# Patient Record
Sex: MALE | Race: OTHER | Employment: OTHER | URBAN - METROPOLITAN AREA
[De-identification: names, ages, dates, MRNs, and addresses within clinical notes are randomized per-mention and may not be internally consistent; named-entity substitution may affect disease eponyms.]

---

## 2018-02-24 ENCOUNTER — APPOINTMENT (OUTPATIENT)
Dept: CT IMAGING | Age: 58
End: 2018-02-24
Attending: EMERGENCY MEDICINE
Payer: SELF-PAY

## 2018-02-24 ENCOUNTER — HOSPITAL ENCOUNTER (EMERGENCY)
Age: 58
Discharge: HOME | End: 2018-02-24
Attending: EMERGENCY MEDICINE
Payer: SELF-PAY

## 2018-02-24 VITALS
WEIGHT: 132.06 LBS | DIASTOLIC BLOOD PRESSURE: 75 MMHG | OXYGEN SATURATION: 99 % | RESPIRATION RATE: 18 BRPM | HEART RATE: 65 BPM | HEIGHT: 67 IN | BODY MASS INDEX: 20.73 KG/M2 | SYSTOLIC BLOOD PRESSURE: 124 MMHG | TEMPERATURE: 97.5 F

## 2018-02-24 DIAGNOSIS — R10.84 ABDOMINAL PAIN, GENERALIZED: Primary | ICD-10-CM

## 2018-02-24 DIAGNOSIS — A08.4 VIRAL GASTROENTERITIS: ICD-10-CM

## 2018-02-24 LAB
ALBUMIN SERPL-MCNC: 3.3 G/DL (ref 3.5–5)
ALBUMIN/GLOB SERPL: 1.1 {RATIO} (ref 1.1–2.2)
ALP SERPL-CCNC: 63 U/L (ref 45–117)
ALT SERPL-CCNC: 14 U/L (ref 12–78)
ANION GAP SERPL CALC-SCNC: 6 MMOL/L (ref 5–15)
AST SERPL-CCNC: 15 U/L (ref 15–37)
BASOPHILS # BLD: 0.1 K/UL (ref 0–0.1)
BASOPHILS NFR BLD: 1 % (ref 0–1)
BILIRUB SERPL-MCNC: 0.4 MG/DL (ref 0.2–1)
BUN SERPL-MCNC: 15 MG/DL (ref 6–20)
BUN/CREAT SERPL: 18 (ref 12–20)
CALCIUM SERPL-MCNC: 8.3 MG/DL (ref 8.5–10.1)
CHLORIDE SERPL-SCNC: 107 MMOL/L (ref 97–108)
CO2 SERPL-SCNC: 27 MMOL/L (ref 21–32)
CREAT SERPL-MCNC: 0.84 MG/DL (ref 0.7–1.3)
DIFFERENTIAL METHOD BLD: ABNORMAL
EOSINOPHIL # BLD: 1.7 K/UL (ref 0–0.4)
EOSINOPHIL NFR BLD: 21 % (ref 0–7)
ERYTHROCYTE [DISTWIDTH] IN BLOOD BY AUTOMATED COUNT: 13.1 % (ref 11.5–14.5)
GLOBULIN SER CALC-MCNC: 3.1 G/DL (ref 2–4)
GLUCOSE SERPL-MCNC: 152 MG/DL (ref 65–100)
HCT VFR BLD AUTO: 38.7 % (ref 36.6–50.3)
HGB BLD-MCNC: 13.2 G/DL (ref 12.1–17)
IMM GRANULOCYTES # BLD: 0 K/UL (ref 0–0.04)
IMM GRANULOCYTES NFR BLD AUTO: 0 % (ref 0–0.5)
LIPASE SERPL-CCNC: 135 U/L (ref 73–393)
LYMPHOCYTES # BLD: 1.9 K/UL (ref 0.8–3.5)
LYMPHOCYTES NFR BLD: 24 % (ref 12–49)
MCH RBC QN AUTO: 30.8 PG (ref 26–34)
MCHC RBC AUTO-ENTMCNC: 34.1 G/DL (ref 30–36.5)
MCV RBC AUTO: 90.2 FL (ref 80–99)
MONOCYTES # BLD: 0.6 K/UL (ref 0–1)
MONOCYTES NFR BLD: 7 % (ref 5–13)
NEUTS SEG # BLD: 3.6 K/UL (ref 1.8–8)
NEUTS SEG NFR BLD: 47 % (ref 32–75)
NRBC # BLD: 0 K/UL (ref 0–0.01)
NRBC BLD-RTO: 0 PER 100 WBC
PLATELET # BLD AUTO: 254 K/UL (ref 150–400)
PMV BLD AUTO: 9.2 FL (ref 8.9–12.9)
POTASSIUM SERPL-SCNC: 3.8 MMOL/L (ref 3.5–5.1)
PROT SERPL-MCNC: 6.4 G/DL (ref 6.4–8.2)
RBC # BLD AUTO: 4.29 M/UL (ref 4.1–5.7)
RBC MORPH BLD: ABNORMAL
SODIUM SERPL-SCNC: 140 MMOL/L (ref 136–145)
TROPONIN I SERPL-MCNC: 0.04 NG/ML
WBC # BLD AUTO: 7.9 K/UL (ref 4.1–11.1)

## 2018-02-24 PROCEDURE — 74177 CT ABD & PELVIS W/CONTRAST: CPT

## 2018-02-24 PROCEDURE — 74011250637 HC RX REV CODE- 250/637: Performed by: EMERGENCY MEDICINE

## 2018-02-24 PROCEDURE — 96374 THER/PROPH/DIAG INJ IV PUSH: CPT

## 2018-02-24 PROCEDURE — 74011636320 HC RX REV CODE- 636/320: Performed by: EMERGENCY MEDICINE

## 2018-02-24 PROCEDURE — 83690 ASSAY OF LIPASE: CPT | Performed by: EMERGENCY MEDICINE

## 2018-02-24 PROCEDURE — 74011250636 HC RX REV CODE- 250/636: Performed by: EMERGENCY MEDICINE

## 2018-02-24 PROCEDURE — 93005 ELECTROCARDIOGRAM TRACING: CPT

## 2018-02-24 PROCEDURE — 99284 EMERGENCY DEPT VISIT MOD MDM: CPT

## 2018-02-24 PROCEDURE — 36415 COLL VENOUS BLD VENIPUNCTURE: CPT | Performed by: EMERGENCY MEDICINE

## 2018-02-24 PROCEDURE — 96375 TX/PRO/DX INJ NEW DRUG ADDON: CPT

## 2018-02-24 PROCEDURE — 80053 COMPREHEN METABOLIC PANEL: CPT | Performed by: EMERGENCY MEDICINE

## 2018-02-24 PROCEDURE — 96361 HYDRATE IV INFUSION ADD-ON: CPT

## 2018-02-24 PROCEDURE — 84484 ASSAY OF TROPONIN QUANT: CPT | Performed by: EMERGENCY MEDICINE

## 2018-02-24 PROCEDURE — 85025 COMPLETE CBC W/AUTO DIFF WBC: CPT | Performed by: EMERGENCY MEDICINE

## 2018-02-24 RX ORDER — ONDANSETRON 4 MG/1
4 TABLET, ORALLY DISINTEGRATING ORAL
Qty: 10 TAB | Refills: 0 | Status: SHIPPED | OUTPATIENT
Start: 2018-02-24

## 2018-02-24 RX ORDER — FENTANYL CITRATE 50 UG/ML
50 INJECTION, SOLUTION INTRAMUSCULAR; INTRAVENOUS
Status: COMPLETED | OUTPATIENT
Start: 2018-02-24 | End: 2018-02-24

## 2018-02-24 RX ORDER — KETOROLAC TROMETHAMINE 30 MG/ML
15 INJECTION, SOLUTION INTRAMUSCULAR; INTRAVENOUS
Status: COMPLETED | OUTPATIENT
Start: 2018-02-24 | End: 2018-02-24

## 2018-02-24 RX ORDER — MAGNESIUM CITRATE
296 SOLUTION, ORAL ORAL
Qty: 1 BOTTLE | Refills: 0 | Status: SHIPPED | OUTPATIENT
Start: 2018-02-24 | End: 2018-02-24

## 2018-02-24 RX ORDER — ONDANSETRON 4 MG/1
4 TABLET, ORALLY DISINTEGRATING ORAL
Status: COMPLETED | OUTPATIENT
Start: 2018-02-24 | End: 2018-02-24

## 2018-02-24 RX ADMIN — IOPAMIDOL 100 ML: 755 INJECTION, SOLUTION INTRAVENOUS at 15:24

## 2018-02-24 RX ADMIN — SODIUM CHLORIDE 1000 ML: 900 INJECTION, SOLUTION INTRAVENOUS at 14:22

## 2018-02-24 RX ADMIN — KETOROLAC TROMETHAMINE 15 MG: 30 INJECTION, SOLUTION INTRAMUSCULAR at 14:23

## 2018-02-24 RX ADMIN — ONDANSETRON 4 MG: 4 TABLET, ORALLY DISINTEGRATING ORAL at 14:23

## 2018-02-24 RX ADMIN — FENTANYL CITRATE 50 MCG: 50 INJECTION, SOLUTION INTRAMUSCULAR; INTRAVENOUS at 14:23

## 2018-02-24 NOTE — ED PROVIDER NOTES
HPI Comments: 65 y.o. male with no significant past medical history who presents to the ED with chief complaint of abdominal pain. Pt reports abdominal pain onset 6 days ago accompanied by nausea, vomiting, decreased appetite, constipation, and chills. Pt states his abdominal pain is exacerbated by eating and fluctuates in intensity. Pt states he feels like he has to have a BM but his last one was 2 days ago. Pt states he has only been eating one meal per day due to his sx. Pt states he was seen at the [address] [place] [address] and was referred to the ED for further evaluation. Pt states he has hx of appendectomy in [place]. There are no other acute medical complaints voiced at this time. PCP: No primary care provider on file. Note written by Marnie Cooper, as dictated by Anali Marcum MD 1:49 PM     The history is provided by the patient. The history is limited by a language barrier (Turkish). A  was used ([employer]). No past medical history on file. No past surgical history on file. No family history on file. Social History     Social History    Marital status:      Spouse name: N/A    Number of children: N/A    Years of education: N/A     Occupational History    Not on file. Social History Main Topics    Smoking status: Not on file    Smokeless tobacco: Not on file    Alcohol use Not on file    Drug use: Not on file    Sexual activity: Not on file     Other Topics Concern    Not on file     Social History Narrative         ALLERGIES: Review of patient's allergies indicates no known allergies. Review of Systems   Constitutional: Positive for appetite change (decreased) and chills. Gastrointestinal: Positive for abdominal pain, constipation, nausea and vomiting. All other systems reviewed and are negative.       Vitals:    02/24/18 1332   BP: 104/54   Pulse: 65   Resp: 18   Temp: 97.5 °F (36.4 °C)   SpO2: 98%   Weight: 59.9 kg (132 lb 0.9 oz)   Height: 5' 7\" (1.702 m)            Physical Exam   Constitutional: He is oriented to person, place, and time. He appears well-developed and well-nourished. No distress. Uncomfortable appearing, AxOx4, speaking in complete Polish sentences   HENT:   Head: Normocephalic and atraumatic. Nose: Nose normal.   Mouth/Throat: Oropharynx is clear and moist. No oropharyngeal exudate. Eyes: Conjunctivae and EOM are normal. Pupils are equal, round, and reactive to light. Right eye exhibits no discharge. Left eye exhibits no discharge. Neck: Normal range of motion. Neck supple. Cardiovascular: Normal rate, regular rhythm, normal heart sounds and intact distal pulses. Exam reveals no gallop and no friction rub. No murmur heard. Pulmonary/Chest: Effort normal and breath sounds normal. No respiratory distress. He has no wheezes. He has no rales. He exhibits no tenderness. Abdominal: Soft. Bowel sounds are normal. He exhibits no distension and no mass. There is tenderness. There is no rebound and no guarding. Min ttp/ neg peritoneal signs; Genitourinary:   Genitourinary Comments: Pt denies urinary/ Testicular/ scrotal or penile  complaints   Musculoskeletal: Normal range of motion. He exhibits no edema. Lymphadenopathy:     He has no cervical adenopathy. Neurological: He is alert and oriented to person, place, and time. He has normal reflexes. No cranial nerve deficit. Coordination normal.   pt has motor/ CV/ Sensation grossly intact to all extremities, R = L in strength;   Skin: Skin is warm and dry. No rash noted. No erythema. Psychiatric: He has a normal mood and affect. Nursing note and vitals reviewed. [facility]      ED Course       Procedures    Chief Complaint   Patient presents with    Abdominal Pain       4:04 PM  The patients presenting problems have been discussed, and they are in agreement with the care plan formulated and outlined with them.   I have encouraged them to ask questions as they arise throughout their visit. MEDICATIONS GIVEN:  Medications   fentaNYL citrate (PF) injection 50 mcg (50 mcg IntraVENous Given 2/24/18 1423)   ketorolac (TORADOL) injection 15 mg (15 mg IntraVENous Given 2/24/18 1423)   sodium chloride 0.9 % bolus infusion 1,000 mL (1,000 mL IntraVENous New Bag 2/24/18 1422)   ondansetron (ZOFRAN ODT) tablet 4 mg (4 mg Oral Given 2/24/18 1423)   iopamidol (ISOVUE-370) 76 % injection 100 mL (100 mL IntraVENous Given 2/24/18 1524)       LABS REVIEWED:  Labs Reviewed   METABOLIC PANEL, COMPREHENSIVE - Abnormal; Notable for the following:        Result Value    Glucose 152 (*)     Calcium 8.3 (*)     Albumin 3.3 (*)     All other components within normal limits   CBC WITH AUTOMATED DIFF - Abnormal; Notable for the following:     EOSINOPHILS 21 (*)     ABS. EOSINOPHILS 1.7 (*)     All other components within normal limits   LIPASE   TROPONIN I   URINALYSIS W/MICROSCOPIC       RADIOLOGY RESULTS:  The following have been ordered and reviewed:  _____________________________________________________________________  _____________________________________________________________________    EKG interpretation:   Rhythm: normal sinus rhythm; and ir-regular due to PVC's . Rate (approx.): 56; Axis: normal; P wave: normal; QRS interval: normal ; ST/T wave: normal; Negative acute significant segmental elevations/ no old study      PROCEDURES:        CONSULTATIONS:       PROGRESS NOTES:      DIAGNOSIS:    1. Abdominal pain, generalized    2. Viral gastroenteritis        PLAN:  1-      ED COURSE: The patients hospital course has been uncomplicated. 4:06 PM  Alvarado Bolden's  results have been reviewed with him. He has been counseled regarding his diagnosis. He verbally conveys understanding and agreement of the signs, symptoms, diagnosis, treatment and prognosis and additionally agrees to Call/ Arrange follow up as recommended with Dr. None in 24 - 48 hours.   He also agrees with the care-plan and conveys that all of his questions have been answered. I have also put together some discharge instructions for him that include: 1) educational information regarding their diagnosis, 2) how to care for their diagnosis at home, as well a 3) list of reasons why they would want to return to the ED prior to their follow-up appointment, should their condition change or for concerns.

## 2018-02-24 NOTE — ED NOTES
Patient with O2 saturations 86% on room air after medications. Patient sitting on stretcher in NAD, awake, and alert. Placed on 4L O2 via NC. Patient using urinal to obtain urine sample at this time.

## 2018-02-24 NOTE — Clinical Note
Thank you for allowing us to provide you with medical care today. We realize that you have many choices for your emergency care needs. We thank you for choosing [facility]. Please choose us in the future for any continued health care needs. We hope we addressed all of your medical concerns. We strive to provide excellent quality care in the Emergency Department. Anything less than excellent does not meet our expectations. The exam and treatment you received in the Emergency Department  were for an emergent problem and are not intended as complete care. It is important that you follow up with a doctor, nurse practitioner, or [address] [place] [address] assistant for ongoing care. If your symptoms worsen or you do not improve as expected and you are un able to reach your usual health care provider, you should return to the Emergency Department. We are available 24 hours a day. Take this sheet with you when you go to your follow-up visit. If you have any problem arranging the follow-up visit, co ntact the Emergency Department immediately. Make an appointment your family doctor for follow up of this visit. Return to the ER if you are unable to be seen in a timely manner.

## 2018-02-24 NOTE — ED TRIAGE NOTES
Abdominal pain for a week, worsened over the last 2 days. LBM 2 days ago and is feeling very constipated.

## 2018-02-24 NOTE — DISCHARGE INSTRUCTIONS
Gastroenteritis: Instrucciones de cuidado - [ Gastroenteritis: Care Instructions ]  Instrucciones de cuidado    La gastroenteritis es morro enfermedad que puede causar náuseas, vómito y [place]. A veces se la llama \"gastroenteritis viral\". Puede ser causada por morro bacteria o un virus. Es probable que comience a sentirse mejor en 1 a 2 días. Entretanto, descanse mucho y asegúrese de no deshidratarse. La deshidratación ocurre cuando quigley cuerpo pierde demasiado líquido. La atención de seguimiento es morro parte clave de quigley tratamiento y seguridad. Asegúrese de hacer y acudir a todas las citas, y llame a quigley médico si está teniendo problemas. También es morro buena idea saber los resultados de los exámenes y mantener morro lista de los medicamentos que hubert. ¿Cómo puede cuidarse en el hogar? · Si quigley médico le recetó antibióticos, tómelos según las indicaciones. No deje de tomarlos por el hecho de sentirse mejor. Debe carrie todos los antibióticos hasta terminarlos. · Caroline abundantes líquidos para prevenir la deshidratación, lo suficiente para que quigley orina sea de color amarillo aundrea o transparente hans el agua. Elija beber agua y otros líquidos jessica sin cafeína hasta que se sienta mejor. Si tiene morro enfermedad del riñón, del corazón o del hígado y tiene que [place]'s líquidos, hable con quigley médico antes de aumentar quigley consumo. · Caroline los líquidos lentamente, en cantidades pequeñas y frecuentes, ya que beber [employer] muy rápido le puede causar vómito. · Empiece a comer alimentos suaves, hans pan jossy seco, yogur, arroz, bananas (plátanos) y puré de Synchari. Evite los alimentos condimentados, picantes o con gran contenido de Ayanna castro y no caroline alcohol ni cafeína matt tate o [address]. No caroline leche ni coma helado hasta que se sienta mejor. Cómo prevenir la gastroenteritis  · [address] comidas calientes, calientes y las frías, frías.   · No consuma pablo, aderezos, ensaladas u otros alimentos que [place] permanecido a temperatura ambiente matt más de 2 horas. · Utilice un termómetro para verificar la temperatura de quigley refrigerador (nevera). La temperatura debería estar entre 34°F y 40°F (1°C y 4°C). · Descongele la carne en el refrigerador o el microondas, no sobre la encimera. · [address] y la cocina limpias. Lávese las [place], las tablas de picar y las encimeras con frecuencia, con agua jabonosa caliente. · Cocine la carne hasta que esté anay cocinada. · No coma huevos crudos ni salsas no cocidas hechas con huevos crudos. · No corra riesgos. Si la comida sabe o parece echada a perder, deséchela. ¿Cuándo debe pedir ayuda? Llame al 911 en cualquier momento que considere que necesita atención de emergencia. Por ejemplo, llame si:  ? · Vomita yuko o algo parecido a granos de café molido. ? · Se desmayó (perdió el conocimiento). ? · Las heces son de color rojizo o muy sanguinolentas (con yuko). ?Llame a quigley médico ahora mismo o busque atención médica inmediata si:  ? · Tiene dolor intenso en el vientre. ? · Tiene señales de necesitar más líquidos. Tiene los ojos hundidos, la boca seca y poca orina de color oscuro. ? · Siente hans si fuera a desmayarse. ? · Tiene mayor dolor abdominal que no desaparece en 1 a 2 días. ? · Tiene nuevas náuseas o éstas aumentan, o tiene vómito. ? · Tiene fiebre nueva o más erich. ? · Cesilia heces son negruzcas y parecidas al alquitrán o tienen rastros de Prasanth. ?Preste especial atención a los cambios en quigley tehresa y asegúrese de comunicarse con quigley médico si:  ? · Se siente mareado o aturdido. ? · Orina menos de lo habitual o quigley orina es de color amarillento oscuro o amarronado. ? · No se siente mejor con cada día que pasa. ¿Dónde puede encontrar más información en inglés? Jorge Luis Begin a http://valentin-fransisco.info/.   Escriba N142 en la búsqueda para aprender más acerca de \"Gastroenteritis: Instrucciones de cuidado - [ Gastroenteritis: Care Instructions ]. \"  Revisado: 3 Alejandra Sandhu 2017  Versión del contenido: 11.4  © 7808-7978 Healthwise, Incorporated. Las instrucciones de cuidado fueron adaptadas bajo licencia por [employer] (which disclaims liability or warranty for this information). Si usted tiene [place] afección médica o sobre estas instrucciones, siempre pregunte a del cid profesional de theresa. Healthwise, Incorporated niega toda garantía o responsabilidad por del cid uso de esta información. Dolor abdominal: Instrucciones de cuidado - [ Abdominal Pain: Care Instructions ]  Instrucciones de cuidado    El dolor abdominal tiene muchas causas posibles. Algunas de ellas no son graves y mejoran por sí solas en unos días. Otras requieren [place] y [place]. Si del cid dolor continúa o ASHLEY, necesitará morro nueva revisión y [place] pruebas para determinar qué pasa. Es posible que necesite cirugía para corregir el problema. No ignore nuevos síntomas, hans fiebre, náuseas y [place], [facility], dolor que ASHLEY o Jessica. Podrían ser señales de un problema más grave. Del Cid médico puede haberle recomendado morro consulta de [employer] las 8 o 12 horas siguientes. Si no se siente mejor, es posible que requiera [place] o [place]. El médico lo porter revisado minuciosamente, saran puede nanci problemas más tarde. Si nota algún problema o síntomas nuevos, busque tratamiento médico inmediatamente. La atención de seguimiento es morro parte clave de del cid tratamiento y seguridad. Asegúrese de hacer y acudir a todas las citas, y llame a del cid médico si está teniendo problemas. También es morro buena idea saber los resultados de los exámenes y mantener morro lista de los medicamentos que hubert. ¿Cómo puede cuidarse en el hogar? · Descanse hasta que se sienta mejor. · Para prevenir la deshidratación, disha abundantes líquidos, suficientes para que del cid orina sea de color amarillo aundrea o transparente hans el agua.  Jovani Martino beber agua y otros líquidos jessica sin cafeína hasta que se sienta mejor. Si tiene [place] [facility], del corazón o del hígado y tiene que [place]'s líquidos, hable con quigley médico antes de aumentar quigley consumo. · Si tiene [employer], coma alimentos suaves, hans arroz, pan jossy seco o galletas saladas, bananas (plátanos) y puré de Synchari. Trate de comer varias comidas pequeñas al día en lugar de dos o reagan grandes. · Espere hasta 48 horas después de que todos los síntomas hayan desaparecido antes de comer alimentos condimentados, alcohol y bebidas que contengan cafeína. · No consuma alimentos ricos en grasa. · Evite medicamentos antiinflamatorios hans aspirina, ibuprofeno (Advil, Motrin) y naproxeno (Aleve). Pueden causar [employer]. Dígale a quigley médico si está tomando aspirina diariamente debido a otro problema de theresa. ¿Cuándo debe pedir ayuda? Llame al 911 en cualquier momento que considere que necesita atención de emergencia. Por ejemplo, llame si:  ? · Se desmayó (perdió el conocimiento). ? · Las heces son de color rojizo o muy sanguinolentas (con yuko). ? · Vomita yuko o algo parecido a granos de café molido. ? · Tiene dolor abdominal nuevo e intenso. ? Llame a quigley médico ahora mismo o busque atención médica inmediata si:  ? · Quigley dolor empeora, sobre todo si se concentra en morro cm parte del vientre. ? · Vuelve a tener fiebre o tiene fiebre más erich. ? · Cesilia heces son negruzcas y parecidas al alquitrán o tienen rastros de Prasanth. ? · Tiene sangrado vaginal inesperado. ? · Tiene síntomas de morro infección del tracto urinario. Estos podrían incluir:  ¨ Dolor al [place]. ¨ Orinar con más frecuencia que lo habitual.  ¨ [place] en la [place]. ? · [employer] o aturdimiento, o que está a punto de [place]. ?Preste especial atención a los cambios en quigley theresa y asegúrese de comunicarse con quigley médico si:  ? · No está mejorando después de 1 día (24 horas).    ¿Dónde puede encontrar más información en inglés? Evi Chang a http://valentin-fransisco.info/. Selvin Callahan H368 en la búsqueda para aprender más acerca de \"Dolor abdominal: Instrucciones de cuidado - [ Abdominal Pain: Care Instructions ]. \"  Revisado: 20 Nataliia Espinal 2017  Versión del contenido: 11.4  © 0683-5934 Healthwise, Incorporated. Las instrucciones de cuidado fueron adaptadas bajo licencia por [employer] (which disclaims liability or warranty for this information). Si usted tiene [place] afección médica o sobre estas instrucciones, siempre pregunte a quigley profesional de theresa. Healthwise, Incorporated niega toda garantía o responsabilidad por quigley uso de esta información. We hope that we have addressed all of your medical concerns. The examination and treatment you received in the Emergency Department were for an emergent problem and were not intended as complete care. It is important that you follow up with your healthcare provider(s) for ongoing care. If your symptoms worsen or do not improve as expected, and you are unable to reach your usual health care provider(s), you should return to the Emergency Department. Today's healthcare is undergoing tremendous change, and patient satisfaction surveys are one of the many tools to assess the quality of medical care. You may receive a survey from the [employer] organization regarding your experience in the Emergency Department. I hope that your experience has been completely positive, particularly the medical care that I provided. As such, please participate in the survey; anything less than excellent does not meet my expectations or intentions. [facility] and [address] [place] [address] participate in nationally recognized quality of care measures.   If your blood pressure is greater than 120/80, as reported below, we urge that you seek medical care to address the potential of high blood pressure, commonly known as hypertension. Hypertension can be hereditary or can be caused by certain medical conditions, pain, stress, or \"white coat syndrome. \"       Please make an appointment with your health care provider(s) for follow up of your Emergency Department visit. VITALS:   Patient Vitals for the past 8 hrs:   Temp Pulse Resp BP SpO2   02/24/18 1532 - - - 124/75 (!) 86 %   02/24/18 1332 97.5 °F (36.4 °C) 65 18 104/54 98 %          Thank you for allowing us to provide you with medical care today. We realize that you have many choices for your emergency care needs. Please choose us in the future for any continued health care needs. Nayely [address], [address] [address].   Office: [phone]            Recent Results (from the past 24 hour(s))   METABOLIC PANEL, COMPREHENSIVE    Collection Time: 02/24/18  2:32 PM   Result Value Ref Range    Sodium 140 136 - 145 mmol/L    Potassium 3.8 3.5 - 5.1 mmol/L    Chloride 107 97 - 108 mmol/L    CO2 27 21 - 32 mmol/L    Anion gap 6 5 - 15 mmol/L    Glucose 152 (H) 65 - 100 mg/dL    BUN 15 6 - 20 MG/DL    Creatinine 0.84 0.70 - 1.30 MG/DL    BUN/Creatinine ratio 18 12 - 20      GFR est AA >60 >60 ml/min/1.73m2    GFR est non-AA >60 >60 ml/min/1.73m2    Calcium 8.3 (L) 8.5 - 10.1 MG/DL    Bilirubin, total 0.4 0.2 - 1.0 MG/DL    ALT (SGPT) 14 12 - 78 U/L    AST (SGOT) 15 15 - 37 U/L    Alk. phosphatase 63 45 - 117 U/L    Protein, total 6.4 6.4 - 8.2 g/dL    Albumin 3.3 (L) 3.5 - 5.0 g/dL    Globulin 3.1 2.0 - 4.0 g/dL    A-G Ratio 1.1 1.1 - 2.2     CBC WITH AUTOMATED DIFF    Collection Time: 02/24/18  2:32 PM   Result Value Ref Range    WBC 7.9 4.1 - 11.1 K/uL    RBC 4.29 4. 10 - 5.70 M/uL    HGB 13.2 12.1 - 17.0 g/dL    HCT 38.7 36.6 - 50.3 %    MCV 90.2 80.0 - 99.0 FL    MCH 30.8 26.0 - 34.0 PG    MCHC 34.1 30.0 - 36.5 g/dL    RDW 13.1 11.5 - 14.5 %    PLATELET 180 161 - 117 K/uL    MPV 9.2 8.9 - 12.9 FL    NRBC 0.0 0  WBC ABSOLUTE NRBC 0.00 0.00 - 0.01 K/uL    NEUTROPHILS 47 32 - 75 %    LYMPHOCYTES 24 12 - 49 %    MONOCYTES 7 5 - 13 %    EOSINOPHILS 21 (H) 0 - 7 %    BASOPHILS 1 0 - 1 %    IMMATURE GRANULOCYTES 0 0.0 - 0.5 %    ABS. NEUTROPHILS 3.6 1.8 - 8.0 K/UL    ABS. LYMPHOCYTES 1.9 0.8 - 3.5 K/UL    ABS. MONOCYTES 0.6 0.0 - 1.0 K/UL    ABS. EOSINOPHILS 1.7 (H) 0.0 - 0.4 K/UL    ABS. BASOPHILS 0.1 0.0 - 0.1 K/UL    ABS. IMM. GRANS. 0.0 0.00 - 0.04 K/UL    DF AUTOMATED      RBC COMMENTS NORMOCYTIC, NORMOCHROMIC     LIPASE    Collection Time: 02/24/18  2:32 PM   Result Value Ref Range    Lipase 135 73 - 393 U/L   TROPONIN I    Collection Time: 02/24/18  2:32 PM   Result Value Ref Range    Troponin-I, Qt. 0.04 <0.05 ng/mL       Ct Abd Pelv W Cont    Result Date: 2/24/2018  EXAM:  CT ABD PELV W CONT INDICATION: abd pain  abdominal pain for one week, worsening over last 2 days constipation. COMPARISON: CONTRAST:  100 mL of Isovue-370. TECHNIQUE: Following the uneventful intravenous administration of contrast, thin axial images were obtained through the abdomen and pelvis. Coronal and sagittal reconstructions were generated. Oral contrast was not administered. CT dose reduction was achieved through use of a standardized protocol tailored for this examination and automatic exposure control for dose modulation. FINDINGS: LUNG BASES: Clear. INCIDENTALLY IMAGED HEART AND MEDIASTINUM: Unremarkable. LIVER: No mass or biliary dilatation. GALLBLADDER: Unremarkable. SPLEEN: No mass. PANCREAS: No mass or ductal dilatation. ADRENALS: Unremarkable. KIDNEYS: No mass, calculus, or hydronephrosis. STOMACH: Unremarkable. SMALL BOWEL: No dilatation or wall thickening. COLON: Moderate colonic stool. APPENDIX: Not identified. PERITONEUM: No ascites or pneumoperitoneum. RETROPERITONEUM: No lymphadenopathy or aortic aneurysm. REPRODUCTIVE ORGANS: Normal. URINARY BLADDER: No mass or calculus. BONES: No destructive bone lesion.  ADDITIONAL COMMENTS: N/A IMPRESSION: Constipation. Otherwise unremarkable.

## 2018-02-26 LAB
ATRIAL RATE: 55 BPM
CALCULATED P AXIS, ECG09: 56 DEGREES
CALCULATED R AXIS, ECG10: 31 DEGREES
CALCULATED T AXIS, ECG11: 55 DEGREES
DIAGNOSIS, 93000: NORMAL
P-R INTERVAL, ECG05: 114 MS
Q-T INTERVAL, ECG07: 480 MS
QRS DURATION, ECG06: 72 MS
QTC CALCULATION (BEZET), ECG08: 459 MS
VENTRICULAR RATE, ECG03: 55 BPM